# Patient Record
Sex: FEMALE | Race: OTHER | Employment: FULL TIME | URBAN - METROPOLITAN AREA
[De-identification: names, ages, dates, MRNs, and addresses within clinical notes are randomized per-mention and may not be internally consistent; named-entity substitution may affect disease eponyms.]

---

## 2017-05-26 ENCOUNTER — HOSPITAL ENCOUNTER (EMERGENCY)
Age: 47
Discharge: HOME | End: 2017-05-26
Attending: EMERGENCY MEDICINE
Payer: SELF-PAY

## 2017-05-26 ENCOUNTER — APPOINTMENT (OUTPATIENT)
Dept: GENERAL RADIOLOGY | Age: 47
End: 2017-05-26
Attending: NURSE PRACTITIONER
Payer: SELF-PAY

## 2017-05-26 VITALS
SYSTOLIC BLOOD PRESSURE: 114 MMHG | RESPIRATION RATE: 16 BRPM | DIASTOLIC BLOOD PRESSURE: 70 MMHG | WEIGHT: 117.6 LBS | OXYGEN SATURATION: 98 % | HEART RATE: 60 BPM | TEMPERATURE: 98.2 F

## 2017-05-26 DIAGNOSIS — M70.41 PREPATELLAR BURSITIS, RIGHT KNEE: Primary | ICD-10-CM

## 2017-05-26 PROCEDURE — 73562 X-RAY EXAM OF KNEE 3: CPT

## 2017-05-26 PROCEDURE — 99283 EMERGENCY DEPT VISIT LOW MDM: CPT

## 2017-05-26 PROCEDURE — 74011250637 HC RX REV CODE- 250/637: Performed by: NURSE PRACTITIONER

## 2017-05-26 RX ORDER — IBUPROFEN 800 MG/1
800 TABLET ORAL
Qty: 30 TAB | Refills: 0 | Status: SHIPPED | OUTPATIENT
Start: 2017-05-26

## 2017-05-26 RX ORDER — IBUPROFEN 400 MG/1
800 TABLET ORAL
Status: COMPLETED | OUTPATIENT
Start: 2017-05-26 | End: 2017-05-26

## 2017-05-26 RX ORDER — TRAMADOL HYDROCHLORIDE 50 MG/1
50 TABLET ORAL
Status: COMPLETED | OUTPATIENT
Start: 2017-05-26 | End: 2017-05-26

## 2017-05-26 RX ORDER — TRAMADOL HYDROCHLORIDE 50 MG/1
50 TABLET ORAL
Qty: 30 TAB | Refills: 0 | Status: SHIPPED | OUTPATIENT
Start: 2017-05-26

## 2017-05-26 RX ADMIN — IBUPROFEN 800 MG: 400 TABLET, FILM COATED ORAL at 19:18

## 2017-05-26 RX ADMIN — TRAMADOL HYDROCHLORIDE 50 MG: 50 TABLET, FILM COATED ORAL at 19:18

## 2017-05-26 NOTE — ED TRIAGE NOTES
Family helping to translate for pt. Pt. complains of \"bump\" to right knee. Note fluid filled like area to right kneecap. Complains of pain.

## 2017-05-26 NOTE — DISCHARGE INSTRUCTIONS
We hope that we have addressed all of your medical concerns. The examination and treatment you received in the Emergency Department were for an emergent problem and were not intended as complete care. It is important that you follow up with your healthcare provider(s) for ongoing care. If your symptoms worsen or do not improve as expected, and you are unable to reach your usual health care provider(s), you should return to the Emergency Department. Today's healthcare is undergoing tremendous change, and patient satisfaction surveys are one of the many tools to assess the quality of medical care. You may receive a survey from the [employer] regarding your experience in the Emergency Department. I hope that your experience has been completely positive, particularly the medical care that I provided. As such, please participate in the survey; anything less than excellent does not meet my expectations or intentions. [facility] and [address] [place] [address] participate in nationally recognized quality of care measures. If your blood pressure is greater than 120/80, as reported below, we urge that you seek medical care to address the potential of high blood pressure, commonly known as hypertension. Hypertension can be hereditary or can be caused by certain medical conditions, pain, stress, or \"white coat syndrome. \"       Please make an appointment with your health care provider(s) for follow up of your Emergency Department visit. VITALS:   Patient Vitals for the past 8 hrs:   Temp Pulse Resp BP SpO2   05/26/17 1837 98.2 °F (36.8 °C) 60 16 114/70 98 %          Thank you for allowing us to provide you with medical care today. We realize that you have many choices for your emergency care needs. Please choose us in the future for any continued health care needs. Bibi Bedolla, [address] [place] [address] [address]   Office: [phone]            No results found for this or any previous visit (from the past 24 hour(s)). No results found. Bursitis de la rótula: Instrucciones de cuidado - [ Kneecap Bursitis: Care Instructions ]  Instrucciones de cuidado    La bursitis es la inflamación de la bursa. Eli bursa es un pequeño saco de líquido que amortigua eli articulación y le permite moverse fácilmente. La bursitis de la rótula (bursitis rotuliana) es eli inflamación de la bursa que se encuentra entre la parte frontal de la rótula y la piel. Arrodillarse liu mucho tiempo puede causar bursitis rotuliana, que puede formar un bulto con forma de huevo en el frente de la [place]. La bursitis suele mejorar si tonya la actividad que la causó. Si persiste o empeora a pesar del tratamiento en el [facility], morris médico podría extraerle líquido de la bursa mediante eli aguja. [place] podría aliviar el dolor y permitirle al médico saber si hay infección. Si es así, el médico le recetará antibióticos. Si solo está [place], podrían darle eli inyección de corticosteroides para reducir la hinchazón y el dolor. Morris médico podría recomendarle fisioterapia y ejercicios de estiramiento. En pocos casos se necesitará eli cirugía para drenar o extraer la bursa. La atención de seguimiento es eli parte clave de morris tratamiento y seguridad. Asegúrese de hacer y acudir a todas las citas, y llame a morris médico si está teniendo problemas. También es eli buena idea saber los resultados de los exámenes y mantener eli lista de los medicamentos que callie. ¿Cómo puede cuidarse en el [facility]? · Aplíquese hielo o eli compresa fría en la rótula liu 10 a 20 minutos cada vez. Póngase un paño ivy entre el hielo y la piel. · Después de 3 días de usar hielo, puede aplicarse calor en la rótula. Puede usar eli bolsa de [place], eli almohadilla térmica ajustada a baja temperatura o eli toalla húmeda tibia.   · Eleve la pierna adolorida sobre eli almohada cuando se aplique hielo o en cualquier momento que se siente o acueste liu los 3 días siguientes. Trate de mantenerla por encima del nivel del corazón. [place] ayudará a reducir la hinchazón. · Descanse la rodilla. Deje de hacer cualquier actividad que le cause dolor. Millie actividades que no ejerzan tensión en la rodilla. · [employer] medicamentos exactamente diana le fueron recetados. Llame a morris médico si mati estar teniendo problemas con morris medicamento. · Pregúntele a morris médico si puede sanjuana un analgésico (medicamento para el dolor) de venta lisa, diana acetaminofén (Tylenol), ibuprofeno (Advil, Motrin) o naproxeno (Aleve). Sea viviana con los medicamentos. Ara y siga todas las instrucciones de la [place]. · Para prevenir y aliviar la bursitis rotuliana en el [facility] y las actividades diarias:  ¨ Use rodilleras cuando se arrodille en superficies duras. Evite arrodillarse liu mucho tiempo a la vez. ¨ Fortalezca y estire los músculos de las piernas. ¨ Evite doblar mucho las rodillas. · Lentamente, puede retomar la [employer] causó el [place], [facility] guajardo con menos esfuerzo hasta que pueda hacerla sin dolor o Branda Blotter. Asegúrese de hacer precalentamiento antes de la actividad y de estirarse después de quan. ¿Cuándo debe pedir ayuda? Llame a morris médico ahora mismo o busque atención médica inmediata si:  · Tiene fiebre. · Tiene mayor hinchazón o enrojecimiento en la jose de la rodilla. · No puede usar la rodilla o el dolor en la [place]. Preste especial atención a los cambios en morris valeri y asegúrese de comunicarse con morris médico si:  · Tiene dolor liu 2 o más semanas a pesar del tratamiento en el hogar. ¿Dónde puede encontrar más información en inglés? Emily Sales a http://jose-herlinda.info/. Smitha Clement M665 en la búsqueda para aprender más acerca de \"Bursitis de la rótula: Instrucciones de cuidado - [ Kneecap Bursitis: Care Instructions ]. \"  Revisado: 23 mendieta, 2016  Versión del contenido: 11.2  © 0580-3069 Healthwise, Incorporated. Las instrucciones de cuidado fueron adaptadas bajo licencia por [employer] (which disclaims liability or warranty for this information). Si usted tiene [place] afección médica o sobre estas instrucciones, siempre pregunte a morris profesional de valeri. Healthwise, Incorporated niega toda garantía o responsabilidad por morris uso de esta información.

## 2017-05-26 NOTE — ED PROVIDER NOTES
HPI Comments: Bonnie Wright is a 56 y.o. female who denies any relevant PMHx who presents ambulatory with friends to [facility] ED with cc of R knee pain x 15d. Pt reports that ambulation makes the pain worse. She states the pain is in the R hip and radiates down the RLE. She states that she also has pain that intermittent and localized to the R knee. She notes an area of localized swelling to the R knee as well. 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 She states that she may have hit her knee on the corner of a bed, however, she is not sure. She denies any fevers, redness, heat to her knee. She denies any generalized swelling to the RLE. She has not taken any medications for her pain. She has no history of similar symptoms in the past. She denies chance of pregnancy. She denies tobacco/ETOH/ substance abuse. PCP: No primary care provider on file. There are no other complaints, changes or physical findings at this time. The history is provided by the patient and a friend. The history is limited by a language barrier. A  was used. History reviewed. No pertinent past medical history. Past Surgical History:   Procedure Laterality Date    HX GYN               History reviewed. No pertinent family history. Social History     Social History    Marital status: SINGLE     Spouse name: N/A    Number of children: N/A    Years of education: N/A     Occupational History    Not on file. Social History Main Topics    Smoking status: Current Every Day Smoker    Smokeless tobacco: Not on file    Alcohol use Yes    Drug use: Not on file    Sexual activity: Not on file     Other Topics Concern    Not on file     Social History Narrative    No narrative on file         ALLERGIES: Review of patient's allergies indicates no known allergies.     Review of Systems   Constitutional: Negative for activity change, appetite change, chills and fever. HENT: Negative for congestion, rhinorrhea, sinus pressure, sneezing and sore throat. Eyes: Negative for pain, discharge and visual disturbance. Respiratory: Negative for cough and shortness of breath. Cardiovascular: Negative for chest pain. Gastrointestinal: Negative for abdominal pain, diarrhea, nausea and vomiting. Genitourinary: Negative for dysuria, flank pain, frequency and urgency. Musculoskeletal: Positive for arthralgias and joint swelling. Negative for back pain, gait problem, myalgias and neck pain. Skin: Negative for color change and rash. Neurological: Negative for dizziness, speech difficulty, weakness, light-headedness, numbness and headaches. Psychiatric/Behavioral: Negative for agitation, behavioral problems and confusion. All other systems reviewed and are negative. Vitals:    05/26/17 1837   BP: 114/70   Pulse: 60   Resp: 16   Temp: 98.2 °F (36.8 °C)   SpO2: 98%   Weight: 53.3 kg (117 lb 9.6 oz)            Physical Exam   Constitutional: She is oriented to person, place, and time. She appears well-developed and well-nourished. No distress. HENT:   Head: Normocephalic and atraumatic. Right Ear: External ear normal.   Left Ear: External ear normal.   Nose: Nose normal.   Mouth/Throat: Oropharynx is clear and moist. No oropharyngeal exudate. Eyes: Conjunctivae and EOM are normal. Pupils are equal, round, and reactive to light. Neck: Normal range of motion. Neck supple. Cardiovascular: Normal rate, regular rhythm, normal heart sounds and intact distal pulses. Pulmonary/Chest: Effort normal and breath sounds normal.   Musculoskeletal:        Right knee: She exhibits decreased range of motion and swelling (localized to the upper portion of the patella, consistent wtih cyst like presentation; not warm or erythematous ). She exhibits no ecchymosis, no deformity, no erythema and normal alignment. Tenderness found.    Neurological: She is alert and oriented to person, place, and time. Skin: Skin is warm and dry. Psychiatric: She has a normal mood and affect. Her behavior is normal. Judgment and thought content normal.   Nursing note and vitals reviewed. MDM  Number of Diagnoses or Management Options  Prepatellar bursitis, right knee:   Diagnosis management comments: DDx; Suprapatellar bursitis, cyst, OA     57 yo F presents with localized swelling and pain x 2w. (+) swelling on x-ray, no fx/dislocation. The patient has been educated on the use of NSAIDS/ avoidance of strenuous activities to assist with relief of current symptoms. Patient has been instructed to f/u with Orthopedic Surgery for further tx of symptoms. Reasons to return to the ED have been reviewed at time of discharge. Amount and/or Complexity of Data Reviewed  Tests in the radiology section of CPT®: ordered and reviewed  Review and summarize past medical records: yes      ED Course       Procedures      LABORATORY TESTS:  No results found for this or any previous visit (from the past 12 hour(s)). IMAGING RESULTS:  XR KNEE RT 3 V   Final Result      EXAM: XR KNEE RT 3 V     INDICATION: swelling/ pain.     COMPARISON: None.     FINDINGS: Three views of the right knee demonstrate no fracture or other acute  osseous or articular abnormality. There is no effusion. There is marked  prepatellar soft tissue swelling.     IMPRESSION  IMPRESSION: Marked prepatellar soft tissue swelling. No fracture or  dislocation. [place] MEDICATIONS GIVEN:  Medications   ibuprofen (MOTRIN) tablet 800 mg (800 mg Oral Given 5/26/17 1918)   traMADol (ULTRAM) tablet 50 mg (50 mg Oral Given 5/26/17 1918)       IMPRESSION:  1. Prepatellar bursitis, right knee        PLAN:  1.    Discharge Medication List as of 5/26/2017  8:05 PM      START taking these medications    Details   ibuprofen (MOTRIN) 800 mg tablet Take 1 Tab by mouth every six (6) hours as needed for Pain., Print, Disp-30 Tab, R-0 traMADol (ULTRAM) 50 mg tablet Take 1 Tab by mouth every six (6) hours as needed for Pain. Max Daily Amount: 200 mg., Print, Disp-30 Tab, R-0           2. Follow-up Information     Follow up With Details Comments [facility] Schedule an appointment as soon as possible for a visit  [address] [place] [phone]  [phone]    Omid Gonzalez Schedule an appointment as soon as possible for a visit  [address] [address] [address] [place] [phone]  [phone]    [address], [facility] DEP Go to As needed, If symptoms worsen [address] [facility]  [phone]        3. Return to ED if worse      Discharge Note:    The patient is ready for discharge. The patient's signs, symptoms, diagnosis, and discharge instruction have been discussed and the patient has conveyed their understanding. The patient is to follow up as recommended or return to the ER should their symptoms worsen. Plan has been discussed and the patient is in agreement.     Sandra Deleon NP

## 2017-07-26 ENCOUNTER — HOSPITAL ENCOUNTER (OUTPATIENT)
Dept: LAB | Age: 47
Discharge: HOME | End: 2017-07-26

## 2017-07-26 LAB
ALBUMIN SERPL BCP-MCNC: 3.7 G/DL (ref 3.5–5)
ALBUMIN/GLOB SERPL: 1.2 {RATIO} (ref 1.1–2.2)
ALP SERPL-CCNC: 55 U/L (ref 45–117)
ALT SERPL-CCNC: 22 U/L (ref 12–78)
ANION GAP BLD CALC-SCNC: 4 MMOL/L (ref 5–15)
AST SERPL W P-5'-P-CCNC: 12 U/L (ref 15–37)
BILIRUB SERPL-MCNC: 0.7 MG/DL (ref 0.2–1)
BUN SERPL-MCNC: 21 MG/DL (ref 6–20)
BUN/CREAT SERPL: 32 (ref 12–20)
CALCIUM SERPL-MCNC: 8.4 MG/DL (ref 8.5–10.1)
CHLORIDE SERPL-SCNC: 105 MMOL/L (ref 97–108)
CHOLEST SERPL-MCNC: 186 MG/DL
CO2 SERPL-SCNC: 31 MMOL/L (ref 21–32)
CREAT SERPL-MCNC: 0.65 MG/DL (ref 0.55–1.02)
GLOBULIN SER CALC-MCNC: 3.1 G/DL (ref 2–4)
GLUCOSE SERPL-MCNC: 95 MG/DL (ref 65–100)
HDLC SERPL-MCNC: 82 MG/DL
HDLC SERPL: 2.3 {RATIO} (ref 0–5)
LDLC SERPL CALC-MCNC: 86 MG/DL (ref 0–100)
LIPID PROFILE,FLP: NORMAL
POTASSIUM SERPL-SCNC: 3.9 MMOL/L (ref 3.5–5.1)
PROT SERPL-MCNC: 6.8 G/DL (ref 6.4–8.2)
SODIUM SERPL-SCNC: 140 MMOL/L (ref 136–145)
TRIGL SERPL-MCNC: 90 MG/DL (ref ?–150)
VLDLC SERPL CALC-MCNC: 18 MG/DL

## 2017-07-26 PROCEDURE — 80061 LIPID PANEL: CPT | Performed by: INTERNAL MEDICINE

## 2017-07-26 PROCEDURE — 80053 COMPREHEN METABOLIC PANEL: CPT | Performed by: INTERNAL MEDICINE

## 2017-08-14 ENCOUNTER — HOSPITAL ENCOUNTER (OUTPATIENT)
Dept: LAB | Age: 47
Discharge: HOME | End: 2017-08-14

## 2017-08-14 PROCEDURE — 87624 HPV HI-RISK TYP POOLED RSLT: CPT | Performed by: NURSE PRACTITIONER

## 2017-08-14 PROCEDURE — 88175 CYTOPATH C/V AUTO FLUID REDO: CPT | Performed by: NURSE PRACTITIONER

## 2018-01-30 ENCOUNTER — HOSPITAL ENCOUNTER (OUTPATIENT)
Dept: GENERAL RADIOLOGY | Age: 48
Discharge: HOME | End: 2018-01-30
Payer: SUBSIDIZED

## 2018-01-30 DIAGNOSIS — M70.51 BURSITIS OF RIGHT KNEE: ICD-10-CM

## 2018-01-30 PROCEDURE — 73562 X-RAY EXAM OF KNEE 3: CPT

## 2018-05-26 ENCOUNTER — HOSPITAL ENCOUNTER (OUTPATIENT)
Dept: LAB | Age: 48
Discharge: HOME | End: 2018-05-26
